# Patient Record
Sex: MALE | Race: OTHER | HISPANIC OR LATINO | URBAN - METROPOLITAN AREA
[De-identification: names, ages, dates, MRNs, and addresses within clinical notes are randomized per-mention and may not be internally consistent; named-entity substitution may affect disease eponyms.]

---

## 2018-01-26 ENCOUNTER — OUTPATIENT (OUTPATIENT)
Dept: OUTPATIENT SERVICES | Facility: HOSPITAL | Age: 38
LOS: 1 days | Discharge: HOME | End: 2018-01-26

## 2018-02-12 DIAGNOSIS — F10.20 ALCOHOL DEPENDENCE, UNCOMPLICATED: ICD-10-CM

## 2021-06-20 ENCOUNTER — EMERGENCY (EMERGENCY)
Facility: HOSPITAL | Age: 41
LOS: 1 days | Discharge: HOME | End: 2021-06-20
Attending: STUDENT IN AN ORGANIZED HEALTH CARE EDUCATION/TRAINING PROGRAM | Admitting: STUDENT IN AN ORGANIZED HEALTH CARE EDUCATION/TRAINING PROGRAM
Payer: COMMERCIAL

## 2021-06-20 VITALS
TEMPERATURE: 98 F | HEART RATE: 87 BPM | RESPIRATION RATE: 16 BRPM | DIASTOLIC BLOOD PRESSURE: 61 MMHG | OXYGEN SATURATION: 98 % | SYSTOLIC BLOOD PRESSURE: 107 MMHG

## 2021-06-20 PROCEDURE — 99283 EMERGENCY DEPT VISIT LOW MDM: CPT

## 2021-06-20 RX ORDER — TETANUS TOXOID, REDUCED DIPHTHERIA TOXOID AND ACELLULAR PERTUSSIS VACCINE, ADSORBED 5; 2.5; 8; 8; 2.5 [IU]/.5ML; [IU]/.5ML; UG/.5ML; UG/.5ML; UG/.5ML
0.5 SUSPENSION INTRAMUSCULAR ONCE
Refills: 0 | Status: COMPLETED | OUTPATIENT
Start: 2021-06-20 | End: 2021-06-20

## 2021-06-20 RX ORDER — OFLOXACIN 0.3 %
1 DROPS OPHTHALMIC (EYE) ONCE
Refills: 0 | Status: COMPLETED | OUTPATIENT
Start: 2021-06-20 | End: 2021-06-20

## 2021-06-20 RX ADMIN — TETANUS TOXOID, REDUCED DIPHTHERIA TOXOID AND ACELLULAR PERTUSSIS VACCINE, ADSORBED 0.5 MILLILITER(S): 5; 2.5; 8; 8; 2.5 SUSPENSION INTRAMUSCULAR at 17:09

## 2021-06-20 RX ADMIN — Medication 1 DROP(S): at 17:25

## 2021-06-20 NOTE — ED PROVIDER NOTE - PATIENT PORTAL LINK FT
You can access the FollowMyHealth Patient Portal offered by [facility] by registering at the following website: [email or web address]. By joining [facility]’s FollowMyHealth portal, you will also be able to view your health information using other applications (apps) compatible with our system.

## 2021-06-20 NOTE — ED PROVIDER NOTE - CLINICAL SUMMARY MEDICAL DECISION MAKING FREE TEXT BOX
40yM s/p plastic surgery for lower eyelids on 6/07 p/w eye swelling and drainage from eyes. Pt states he had the surgery performed in [place] and since the procedure has had b/l eye swelling of conjunctiva with eye irritation. Associated he has yellow drainage from the eyes in the morning. pt w/ conjuctivitis intact vision recent procedure, tdap, ofolaxcacin eye drops, artifiical tears, optho follow up

## 2021-06-20 NOTE — ED PROVIDER NOTE - ATTENDING CONTRIBUTION TO CARE
41y/o M s/p plastic surgery for lower eyelids on 6/07 p/w eye swelling and drainage from eyes. Pt states he had the surgery performed in [place] and since the procedure has had b/l eye swelling of conjunctiva with eye irritation. Associated he has yellow drainage from the eyes in the morning. He has been using drops in the ed without much improvement. He denies blurred vision, nausea, vomiting, diarrhea. Pt states he has spoken with his eye surgeon that told him to take a new antibiotic.   denies fever, chills, chest pain, SOB, abdominal pain, diarrhea, dysuria, syncope, bleeding, new rash,weakness, numbness, blurred vision  + eye redness   ROS  otherwise negative as per HPI  Gen: Awake, Alert, WD, WN, NAD  Head:  NC/AT  Eyes:  PERRL, EOMI, Conjunctiva red, lids normal, no scleral icterus mild conjunctival edema   ENT: OP clear, moist mucus membranes  Neck: supple, nontender, no meningismus, no JVD, trachea midline  Cardiac/CV:  S1 S2, RRR, no M/G/R  Respiratory/Pulm:  CTAB, good air movement, normal resp effort, no wheezes/stridor/retractions/rales/rhonchi  Gastrointestinal/Abdomen:  Soft, nontender, nondistended, +BS, no rebound/guarding  Back:  no CVAT, no MLT  Ext:  warm, well perfused, moving all extremities spontaneously, no peripheral edema, distal pulses intact  Skin: intact, no rash  Neuro:  AAOx3, sensation intact, motor 5/5 x 4 extremities, normal gait, speech clear  MDM as above

## 2021-06-20 NOTE — ED PROVIDER NOTE - OBJECTIVE STATEMENT
40yM s/p plastic surgery for lower eyelids on 6/07 p/w eye swelling and drainage from eyes. Pt states he had the surgery performed in [place] and since the procedure has had b/l eye swelling of conjunctiva with eye irritation. Associated he has yellow drainage from the eyes in the morning. Pt has been using a prescribed eye drop since the procedure. Was told by his surgeon the swelling should be more improved at this time. Pt denies fever/chills, visual changes, eye pain, tearing, light sensitivity, headache, dizziness, cp, sob or any other concerns.

## 2021-06-20 NOTE — ED PROVIDER NOTE - PHYSICAL EXAMINATION
b/l chemosis, L>R, EOMs intact, PERRL b/l chemosis, L>R, EOMs intact, PERRL  mild conjunctival injection b/l

## 2021-06-20 NOTE — ED ADULT TRIAGE NOTE - CHIEF COMPLAINT QUOTE
Pt complaining of redness and yellow drainage from b/l eyes x3 weeks. Denies vision changes. Denies PMH

## 2021-06-20 NOTE — ED PROVIDER NOTE - NSFOLLOWUPINSTRUCTIONS_ED_ALL_ED_FT
Follow up with an ophthalmologist within 1 week, referral list provided, you can also call the eye clinic at the location listed below to make an appointment  [address]  [place] [address]  [phone]    Apply Ocuflox drops, one drop to each eye, 4 times a day for 7 days  Apply artifical tears to both eyes 4-6 times a day    Return to the ER with any worsening or concerning symptoms, increased swelling, eye pain, changes to your vision, fever/chills or any other concerns

## 2025-07-07 ENCOUNTER — EMERGENCY (EMERGENCY)
Facility: HOSPITAL | Age: 45
LOS: 1 days | End: 2025-07-07
Admitting: STUDENT IN AN ORGANIZED HEALTH CARE EDUCATION/TRAINING PROGRAM
Payer: COMMERCIAL

## 2025-07-07 VITALS
HEART RATE: 80 BPM | WEIGHT: 160.06 LBS | HEIGHT: 66 IN | DIASTOLIC BLOOD PRESSURE: 74 MMHG | OXYGEN SATURATION: 97 % | TEMPERATURE: 98 F | RESPIRATION RATE: 16 BRPM | SYSTOLIC BLOOD PRESSURE: 111 MMHG

## 2025-07-07 PROBLEM — Z78.9 OTHER SPECIFIED HEALTH STATUS: Chronic | Status: ACTIVE | Noted: 2021-06-20

## 2025-07-07 PROCEDURE — 12002 RPR S/N/AX/GEN/TRNK2.6-7.5CM: CPT

## 2025-07-07 PROCEDURE — 99284 EMERGENCY DEPT VISIT MOD MDM: CPT | Mod: 25

## 2025-07-07 PROCEDURE — 73630 X-RAY EXAM OF FOOT: CPT | Mod: 26,RT

## 2025-07-07 RX ORDER — LIDOCAINE HCL/EPINEPHRINE/PF 1 %-1:200K
10 AMPUL (ML) INJECTION ONCE
Refills: 0 | Status: COMPLETED | OUTPATIENT
Start: 2025-07-07 | End: 2025-07-07

## 2025-07-07 RX ORDER — IBUPROFEN 200 MG
600 TABLET ORAL ONCE
Refills: 0 | Status: COMPLETED | OUTPATIENT
Start: 2025-07-07 | End: 2025-07-07

## 2025-07-07 RX ADMIN — Medication 600 MILLIGRAM(S): at 12:50

## 2025-07-07 RX ADMIN — Medication 10 MILLILITER(S): at 13:06

## 2025-07-07 RX ADMIN — Medication 600 MILLIGRAM(S): at 11:50

## 2025-07-07 NOTE — ED PROVIDER NOTE - CLINICAL SUMMARY MEDICAL DECISION MAKING FREE TEXT BOX
44-year-old male with no significant past medical history presents to the ER with complaint of right foot laceration status post stepping on a curved glass last night approximate 11 hours ago at midnight while barefoot taking out his dog.  Area was wrapped up but paining him this morning with attempted ambulation.  Patient tetanus up-to-date received approximately 4 years ago.  Patient reports that he removed the glass does not believe any leftover foreign body is left.  No numbness no tingling no other complaints or trauma.  xray r/o fb.   no fb visualized on exam  tetanus utd.  wound care   laceration repair.

## 2025-07-07 NOTE — ED ADULT TRIAGE NOTE - AVIAN FLU SYMPTOMS
Monitor summary: SR 82-96, VT -0.16, QRS -0.14, QT -0.42, with rare PVCs per strip from the monitor room.           No

## 2025-07-07 NOTE — ED PROVIDER NOTE - PHYSICAL EXAMINATION
3cm laceration to plantar right foot in between metatarsal 4 and 5     Well appearing, well nourished, awake, alert, oriented to person, place, time/situation and in no apparent distress.    Airway patent    Eyes without scleral injection. No jaundice.    Strong pulse.    Respirations unlabored.    Spine appears normal, range of motion is not limited    Alert and oriented, no gross motor or sensory deficits.    Skin normal color for race, warm, dry and intact. laceration 3cm laceration to plantar right foot in between metatarsal 4 and 5 to sub cutaneous. other small superficial laceration to dermis 3cm laceration to plantar right foot in between metatarsal 4 and 5     Well appearing, well nourished, awake, alert, oriented to person, place, time/situation and in no apparent distress.    Airway patent    Eyes without scleral injection. No jaundice.    Strong pulse.    Respirations unlabored.    Spine appears normal, range of motion is not limited    Alert and oriented, no gross motor or sensory deficits.    Skin normal color for race, warm, dry and intact. laceration 3cm laceration to plantar right foot in between metatarsal 4 and 5 just to sub cutaneous. other small superficial laceration to dermis

## 2025-07-07 NOTE — ED ADULT NURSE NOTE - OBJECTIVE STATEMENT
patient in intake # 15 alert ox4 came in c/o I stepped on broken glass last night and got a cut on my right foot., laceration to bottom of the right foot with bleeding under control . patient not on AC. c/o pain and discomfort to bare weight. meds given as ordered. awaiting x-ray results and for lac repair.

## 2025-07-07 NOTE — ED ADULT TRIAGE NOTE - CHIEF COMPLAINT QUOTE
Patient c/o laceration to bottom of R foot after stepping on glass in his yard last night. Not actively bleeding at this time. Denies phx

## 2025-07-07 NOTE — ED PROVIDER NOTE - NSFOLLOWUPINSTRUCTIONS_ED_ALL_ED_FT
Follow up with your PMD within 48-72 hours for wound check. Keep sutures covered and dry for 24 hours then clean with soap and water daily.  Apply bacitracin and cover.  Return for suture removal 12-14 days. Any increased pain, redness, streaking (red lines), swelling, fever, chills return to ER.

## 2025-07-07 NOTE — ED PROVIDER NOTE - OBJECTIVE STATEMENT
44-year-old male with no significant past medical history presents to the ER with complaint of right foot laceration status post stepping on a curved glass last night approximate 11 hours ago at midnight while barefoot taking out his dog.  Area was wrapped up but paining him this morning with attempted ambulation.  Patient tetanus up-to-date received approximately 4 years ago.  Patient reports that he removed the glass does not believe any leftover foreign body is left.  No numbness no tingling no other complaints or trauma.